# Patient Record
Sex: MALE | Race: WHITE | NOT HISPANIC OR LATINO | Employment: OTHER | URBAN - METROPOLITAN AREA
[De-identification: names, ages, dates, MRNs, and addresses within clinical notes are randomized per-mention and may not be internally consistent; named-entity substitution may affect disease eponyms.]

---

## 2020-06-24 ENCOUNTER — OFFICE VISIT (OUTPATIENT)
Dept: INTERNAL MEDICINE | Facility: CLINIC | Age: 30
End: 2020-06-24
Payer: MEDICAID

## 2020-06-24 VITALS
HEIGHT: 70 IN | TEMPERATURE: 97.9 F | BODY MASS INDEX: 25.05 KG/M2 | HEART RATE: 62 BPM | RESPIRATION RATE: 16 BRPM | DIASTOLIC BLOOD PRESSURE: 66 MMHG | OXYGEN SATURATION: 97 % | WEIGHT: 175 LBS | SYSTOLIC BLOOD PRESSURE: 104 MMHG

## 2020-06-24 DIAGNOSIS — L81.9 CHANGING PIGMENTED SKIN LESION: Primary | ICD-10-CM

## 2020-06-24 PROCEDURE — 99203 OFFICE O/P NEW LOW 30 MIN: CPT | Performed by: PHYSICIAN ASSISTANT

## 2020-06-24 RX ORDER — IBUPROFEN 200 MG
200 TABLET ORAL PRN
COMMUNITY

## 2020-06-24 RX ORDER — DIAPER,BRIEF,ADULT, DISPOSABLE
3 EACH MISCELLANEOUS
COMMUNITY

## 2020-06-24 SDOH — HEALTH STABILITY: MENTAL HEALTH: HOW OFTEN DO YOU HAVE A DRINK CONTAINING ALCOHOL?: 2-4 TIMES A MONTH

## 2020-06-24 SDOH — HEALTH STABILITY: MENTAL HEALTH: HOW MANY STANDARD DRINKS CONTAINING ALCOHOL DO YOU HAVE ON A TYPICAL DAY?: 1 OR 2

## 2020-06-24 SDOH — HEALTH STABILITY: MENTAL HEALTH: HOW OFTEN DO YOU HAVE 6 OR MORE DRINKS ON ONE OCCASION?: NEVER

## 2020-06-24 ASSESSMENT — MIFFLIN-ST. JEOR: SCORE: 1755.04

## 2020-06-24 NOTE — PROGRESS NOTES
"Subjective     Enrique Girard is a 30 year old male who presents to clinic today for the following health issues:    HPI   Lesion-(mole check)      Duration: years-couple have changed in last couple weeks    Description (location/character/radiation): raised, cut shaving-    Intensity:  mild, worrisome    Accompanying signs and symptoms: no    History (similar episodes/previous evaluation): yes, previously but they weren't changing-now are    Precipitating or alleviating factors: None    Therapies tried and outcome: None     -------------------------------------    BP Readings from Last 3 Encounters:   06/24/20 104/66    Wt Readings from Last 3 Encounters:   06/24/20 79.4 kg (175 lb)                    Reviewed and updated as needed this visit by Provider  Tobacco  Allergies  Meds         Review of Systems   Constitutional, HEENT, cardiovascular, pulmonary, gi and gu systems are negative, except as otherwise noted.      Objective    /66   Pulse 62   Temp 97.9  F (36.6  C) (Tympanic)   Resp 16   Ht 1.778 m (5' 10\")   Wt 79.4 kg (175 lb)   SpO2 97%   BMI 25.11 kg/m    Body mass index is 25.11 kg/m .  Physical Exam   GENERAL: healthy, alert and no distress  RESP: lungs clear to auscultation - no rales, rhonchi or wheezes  CV: regular rates and rhythm and normal S1 S2, no S3 or S4  SKIN: skin exam of lesions that patient was concerned about  Trunk, with skin tag type lesions noted anterior right and left axillary area  Appears a possible accessory nipple center chest.   Face :- lesion that patient is most worried about  Right side of face there is a raise lesion about 5-6 mm in size with flesh to darker brown pigmentation     Diagnostic Test Results:  none         Assessment & Plan     1. Changing pigmented skin lesion  Referral to derm, patient noted changing mole- raised and pigment change.  Also irritated with shaving as it is raised.   - DERMATOLOGY REFERRAL     BMI:   Estimated body mass index is " "25.11 kg/m  as calculated from the following:    Height as of this encounter: 1.778 m (5' 10\").    Weight as of this encounter: 79.4 kg (175 lb).           See Patient Instructions    No follow-ups on file.    Isatu Downing PA-C  [facility]    "

## 2022-03-28 ENCOUNTER — OFFICE VISIT (OUTPATIENT)
Dept: FAMILY MEDICINE | Facility: CLINIC | Age: 32
End: 2022-03-28

## 2022-03-28 VITALS
RESPIRATION RATE: 18 BRPM | WEIGHT: 185.6 LBS | DIASTOLIC BLOOD PRESSURE: 74 MMHG | OXYGEN SATURATION: 97 % | HEART RATE: 69 BPM | SYSTOLIC BLOOD PRESSURE: 133 MMHG | BODY MASS INDEX: 25.98 KG/M2 | HEIGHT: 71 IN

## 2022-03-28 DIAGNOSIS — M76.891 HIP FLEXOR TENDINITIS, RIGHT: ICD-10-CM

## 2022-03-28 DIAGNOSIS — M22.2X2 PATELLOFEMORAL PAIN SYNDROME OF LEFT KNEE: Primary | ICD-10-CM

## 2022-03-28 DIAGNOSIS — M77.8 TENDINITIS OF RIGHT WRIST: ICD-10-CM

## 2022-03-28 PROCEDURE — 99203 OFFICE O/P NEW LOW 30 MIN: CPT | Performed by: FAMILY MEDICINE

## 2022-03-28 NOTE — PATIENT INSTRUCTIONS
Understanding Patellofemoral Syndrome    Patellofemoral syndrome is a condition that causes pain on the front of the knee. The large bones of the upper and lower leg meet at the knee. This joint also includes a small triangle-shaped bone that rests on top of the leg bones. This is the kneecap (patella). Patellofemoral refers to the patella and the thighbone (femur). These bones are surrounded by connective tissue and muscles. Patellofemoral pain is believed to come from stress on the tissues of and around the knee. It's sometimes called runner's knee or jumper's knee because it's common in people who take part in sports.   What causes patellofemoral syndrome?  No single cause for patellofemoral pain has been found. But many things are likely to contribute to this type of knee pain. These include:     Actions that put repeated stress on the knee, such as running and squatting    Overtraining at a sport    Weak hip or thigh muscles    Normal variations in the way body parts fit together    Poor form during activities that stress the knee, such as running    A fall or blow to the knee  Symptoms of patellofemoral syndrome  Pain is a common symptom. It s often on the front of the knee, but can be around the kneecap. Pain can occur at certain times, such as when you are:     Running    Sitting for a long time with your knees bent, such as at a movie    Walking up or down stairs    Squatting  Other symptoms may include:    A feeling of the knee catching or locking    A grinding or crackling noise in your knee  Treatment for patellofemoral syndrome  Treatment focuses on reducing pain and avoiding further injury. Treatments may include:    Rest your leg. This gives your knee time to recover. You may need to avoid or change the activity that caused the problem, such as not running for a while.    Prescription or over-the-counter medicines. These help reduce inflammation, swelling, and pain. NSAIDs (nonsteroidal  anti-inflammatory drugs) are the most common medicines used. Medicines may be prescribed or bought over the counter. They may be given as pills. Or they may be put on the skin as a gel, cream, or patch.    Cold packs. These help reduce pain.    Stretches and other exercises. These can improve balance, flexibility, and strength.    A shoe insert (orthotic). This can make your knee more stable.    Elastic tape or a brace. These can make your knee more stable.    Physical therapy. This may include exercises or other treatments.    Surgery. In rare cases, if other treatments don t relieve symptoms, you may need surgery.  Possible complications of patellofemoral syndrome  If you don t give your knee time to heal, symptoms may return or get worse. Follow your healthcare provider s instructions on resting and treating your knee.   When to call your healthcare provider  Call your healthcare provider right away if you have any of these:    Fever of 100.4 F (38 C) or higher, or as directed by your provider    Chills    Pain that gets worse    Symptoms that don t get better, or get worse    New symptoms  [employer] last reviewed this educational content on 6/1/2019 2000-2021 [employer]. All rights reserved. This information is not intended as a substitute for professional medical care. Always follow your healthcare professional's instructions.

## 2022-03-28 NOTE — PROGRESS NOTES
"  Franklin Nunez is a 31 year old patient who presents to clinic for evaluation.  He is a new patient.    Right hip pain: approx 6 months.  No injury.  Pain is anterior.  Worse when standing up.  No numbness, tingling, swelling.  He lifts weights and is active.    Left knee pain: about 2 months.  Anterior pain.  Worse with standing and certain movements.  No swelling.  No locking, clicking or catching.  No redness.  No injury or trauma.    Right wrist pain: ongoing for months.  Dorsal.  Uses computer a lot.  Also ultimate frisbee.  No numbness, tingling, weakness        Review of Systems   Constitutional, HEENT, cardiovascular, pulmonary, gi and gu systems are negative, except as otherwise noted.      Objective    /74   Pulse 69   Resp 18   Ht 1.797 m (5' 10.75\")   Wt 84.2 kg (185 lb 9.6 oz)   SpO2 97%   BMI 26.07 kg/m      General: Well appearing, NAD  MSK:     Right hip normal appearance and ROM.  Tenderness over hip flexors.  Negative FADDIR and PAVITHRA.  Neg log roll.    Left knee: Normal appearance and ROM.  No focal tenderness.  Neg patellar grind.  No joint line pain.  Neg leonard.  Neg varus and valgus.  Right wrist: full ROM.  Tenderness over dorsal wrist joint.  Normal strength.    Psych: normal mood and affect        Patellofemoral pain syndrome of left knee  Recommend trial of PT and follow up in 4-6 weeks if not improved to consider imaging  - Physical Therapy Referral; Future    Hip flexor tendinitis, right  Same as above  - Physical Therapy Referral; Future    Tendinitis of right wrist  Same as above  - Physical Therapy Referral; Future              "

## 2022-03-30 ENCOUNTER — THERAPY VISIT (OUTPATIENT)
Dept: PHYSICAL THERAPY | Facility: CLINIC | Age: 32
End: 2022-03-30
Attending: FAMILY MEDICINE
Payer: COMMERCIAL

## 2022-03-30 DIAGNOSIS — M76.891 HIP FLEXOR TENDINITIS, RIGHT: ICD-10-CM

## 2022-03-30 DIAGNOSIS — M77.8 TENDINITIS OF RIGHT WRIST: ICD-10-CM

## 2022-03-30 DIAGNOSIS — M25.551 HIP PAIN, RIGHT: ICD-10-CM

## 2022-03-30 DIAGNOSIS — M25.562 LEFT KNEE PAIN: ICD-10-CM

## 2022-03-30 DIAGNOSIS — M22.2X2 PATELLOFEMORAL PAIN SYNDROME OF LEFT KNEE: ICD-10-CM

## 2022-03-30 PROCEDURE — 97110 THERAPEUTIC EXERCISES: CPT | Mod: GP | Performed by: PHYSICAL THERAPIST

## 2022-03-30 PROCEDURE — 97161 PT EVAL LOW COMPLEX 20 MIN: CPT | Mod: GP | Performed by: PHYSICAL THERAPIST

## 2022-03-30 ASSESSMENT — ACTIVITIES OF DAILY LIVING (ADL)
LIGHT_TO_MODERATE_WORK: NO DIFFICULTY AT ALL
WALKING_15_MINUTES_OR_GREATER: NO DIFFICULTY AT ALL
ROLLING_OVER_IN_BED: NO DIFFICULTY AT ALL
HOW_WOULD_YOU_RATE_YOUR_CURRENT_LEVEL_OF_FUNCTION_DURING_YOUR_USUAL_ACTIVITIES_OF_DAILY_LIVING_FROM_0_TO_100_WITH_100_BEING_YOUR_LEVEL_OF_FUNCTION_PRIOR_TO_YOUR_HIP_PROBLEM_AND_0_BEING_THE_INABILITY_TO_PERFORM_ANY_OF_YOUR_USUAL_DAILY_ACTIVITIES?: 98
WALKING_DOWN_STEEP_HILLS: NO DIFFICULTY AT ALL
GETTING_INTO_AND_OUT_OF_A_BATHTUB: NO DIFFICULTY AT ALL
TWISTING/PIVOTING_ON_INVOLVED_LEG: SLIGHT DIFFICULTY
GOING_UP_1_FLIGHT_OF_STAIRS: NO DIFFICULTY AT ALL
GOING_DOWN_1_FLIGHT_OF_STAIRS: NO DIFFICULTY AT ALL
SITTING_FOR_15_MINUTES: SLIGHT DIFFICULTY
HOS_ADL_SCORE(%): 95.59
WALKING_INITIALLY: NO DIFFICULTY AT ALL
DEEP_SQUATTING: SLIGHT DIFFICULTY
HEAVY_WORK: NO DIFFICULTY AT ALL
HOS_ADL_COUNT: 17
WALKING_APPROXIMATELY_10_MINUTES: NO DIFFICULTY AT ALL
HOS_ADL_ITEM_SCORE_TOTAL: 65
STANDING_FOR_15_MINUTES: NO DIFFICULTY AT ALL
PUTTING_ON_SOCKS_AND_SHOES: NO DIFFICULTY AT ALL
STEPPING_UP_AND_DOWN_CURBS: NO DIFFICULTY AT ALL
GETTING_INTO_AND_OUT_OF_AN_AVERAGE_CAR: SLIGHT DIFFICULTY
RECREATIONAL_ACTIVITIES: NO DIFFICULTY AT ALL
HOS_ADL_HIGHEST_POTENTIAL_SCORE: 68
WALKING_UP_STEEP_HILLS: NO DIFFICULTY AT ALL

## 2022-03-30 NOTE — PROGRESS NOTES
Physical Therapy Initial Evaluation  Subjective:  The history is provided by the patient. No  was used.   Therapist Generated HPI Evaluation  Problem details: Saw MD for left knee pain and right hip/groin pain on 3/28/2022.  Some knee pain with working out with squats and dead lifts and with descending steps.  Plays ultimate and coaches.  Uses the gym at [facility].  Resting and stretching the quad has helped knee but nothing for the hip.   No imaging for the hip or knee.  Pain in the right hip with hip flexed and moving out of a seated position..  Sharp pain in the groin.  No pain with running.  No radiating pain. .         Type of problem:  Right knee.      Condition occurred with:  Insidious onset.  Where condition occurred: at home and in the community.  Patient reports pain:  Anterior.  Pain is described as aching and is intermittent.  Pain is worse during the day.  Since onset symptoms are unchanged.  Symptoms are exacerbated by kneeling, bending/squatting and certain positions  and relieved by rest.      Restrictions due to condition include:  Working in normal job without restrictions.      Therapist Generated HPI Evaluation         Type of problem:  Right hip.    This is a new condition.  Condition occurred with:  Insidious onset.  Where condition occurred: at home.  Patient reports pain:  Anterior and medial.  Pain is described as sharp and aching and is intermittent.  Pain radiates to:  No radiation. Pain is worse during the day.  Since onset symptoms are unchanged.  Associated symptoms:  Loss of motion/stiffness. Symptoms are exacerbated by sitting (moving to standing)  and relieved by nothing.          Patient Health History  Enrique Girard being seen for left knee pain , right hip pain.     Date of Onset: 2 months and 6 months.   Problem occurred: overuse?   Pain is reported as 3/10 on pain scale.  General health as reported by patient is good.     Red flags:  None as reported by  patient.   Other medical allergies details: amoxicillin.           Current occupation is self employed.   Primary job tasks include:  Prolonged sitting, driving and computer work.                                    Objective:  System                                           Hip Evaluation  Hip PROM:    Flexion: Left: no pain   Right: no pain        Internal Rotation: Left: 30    Right: 20  External Rotation: Left: 45    Right: 40              Hip Strength:        Abduction:  Left: 5-/5     Pain:Right: 5-/5    Pain:  Adduction:  Left: 5-/5    Pain:Right:  5-/5   +  Pain:strong/painful                Hip Special Testing:      Left hip negative for the following special tests:  Fadir/Labrum; SLR or Neelima's   Right hip positive for the following special tests:  FabreRight hip negative for the following special tests:  Fadir/Labrum; SLR or Neelima's    Hip Palpation:      Right hip tenderness present at:  Adductors       Knee Evaluation:  ROM:      PROM    Hyperextension: Left: 10    Right:  10            Ligament Testing:  Not Assessed                Special Tests: Special test for knee: pain with squat to 90 and 6 inch step down.    Right knee positive for the following tests:  Asterisk Sign  Palpation:  Palpation of knee: infrapatellar patellar.      Edema:  Normal    Mobility Testing:  Normal            Functional Testing:  : poor hip control on the left with step down                   General     ROS    Assessment/Plan:    Patient is a 31 year old male with right side hip and left side knee complaints.    Patient has the following significant findings with corresponding treatment plan.                Diagnosis 1:  Right groin pain  Pain -  manual therapy, self management and education  Decreased ROM/flexibility - manual therapy, therapeutic exercise and home program  Decreased joint mobility - manual therapy, therapeutic exercise and home program  Decreased strength - therapeutic exercise, therapeutic activities and  home program  Decreased function - therapeutic activities and home program  Diagnosis 2:  Left knee pain   Pain -  manual therapy, self management, education and home program  Decreased function - therapeutic activities and home program    Therapy Evaluation Codes:   1) History comprised of:   Personal factors that impact the plan of care:      Time since onset of symptoms.    Comorbidity factors that impact the plan of care are:      None.     Medications impacting care: None.  2) Examination of Body Systems comprised of:   Body structures and functions that impact the plan of care:      Hip and Knee.   Activity limitations that impact the plan of care are:      Sitting, Sports, Squatting/kneeling and Stairs.  3) Clinical presentation characteristics are:   Stable/Uncomplicated.  4) Decision-Making    Low complexity using standardized patient assessment instrument and/or measureable assessment of functional outcome.  Cumulative Therapy Evaluation is: Low complexity.    Previous and current functional limitations:  (See Goal Flow Sheet for this information)    Short term and Long term goals: (See Goal Flow Sheet for this information)     Communication ability:  Patient appears to be able to clearly communicate and understand verbal and written communication and follow directions correctly.  Treatment Explanation - The following has been discussed with the patient:   RX ordered/plan of care  Anticipated outcomes  Possible risks and side effects  This patient would benefit from PT intervention to resume normal activities.   Rehab potential is excellent.    Frequency:  1 X week, once daily  Duration:  for 3 weeks then 1 time a month for on month  Discharge Plan:  Achieve all LTG.  Independent in home treatment program.  Reach maximal therapeutic benefit.    Please refer to the daily flowsheet for treatment today, total treatment time and time spent performing 1:1 timed codes.

## 2022-03-30 NOTE — PROGRESS NOTES
[facility]    OUTPATIENT Physical Therapy ORTHOPEDIC EVALUATION  PLAN OF TREATMENT FOR OUTPATIENT REHABILITATION  (COMPLETE FOR INITIAL CLAIMS ONLY)  Patient's Last Name, First Name, M.I.  YOB: 1990  Enrique Girard    Provider s Name:  [facility]   Medical Record No.  [id number]   Start of Care Date:  03/30/22   Onset Date:    (3/28/2022 date patient saw MD)   Type:     _X__PT   ___OT Medical Diagnosis:    Encounter Diagnoses   Name Primary?     Patellofemoral pain syndrome of left knee      Hip flexor tendinitis, right      Tendinitis of right wrist      Hip pain, right      Left knee pain         Treatment Diagnosis:  right hip        Goals:     03/30/22 0500   Body Part   Goals listed below are for right hip and left knee   Goal #1   Goal #1 stairs   Previous Functional Level No restrictions   Current Functional Level Descend stairs;in a normal reciprocal pattern   Performance Level with left knee pain 3/10   STG Target Performance Descend stairs;in a normal reciprocal pattern   Performance Level with pain 1/10   Rationale for safe community ambulaton   Due Date 04/30/22   LTG Target Performance Descend stairs;in a normal reciprocal pattern   Performance Level without pain and no pain with squats   Rationale for safe community ambulaton   Due Date 05/30/22   Goal #2   Goal #2 sitting   Previous Functional Level No restrictions   Current Functional Level Hours patient can sit   Performance level one hour with right groin pain sitting and moving out of sitting   LTG Target Performance Hours patient will be able to sit   Performance Level 2 with no c/o hip pain   Rationale to allow rest from standing;for job requirements in their work place   Due date 05/30/22       Therapy Frequency:  once a week  Predicted Duration of Therapy Intervention:  2 months    Alison  Debby, PT                 I CERTIFY THE NEED FOR THESE SERVICES FURNISHED UNDER        THIS PLAN OF TREATMENT AND WHILE UNDER MY CARE     (Physician attestation of this document indicates review and certification of the therapy plan).                       Certification Date From:  03/30/22   Certification Date To:  06/27/22    Referring Provider:  Joel Baca    Initial Assessment        See Epic Evaluation SOC Date: 03/30/22

## 2022-04-06 ENCOUNTER — THERAPY VISIT (OUTPATIENT)
Dept: PHYSICAL THERAPY | Facility: CLINIC | Age: 32
End: 2022-04-06
Payer: COMMERCIAL

## 2022-04-06 DIAGNOSIS — M25.551 HIP PAIN, RIGHT: ICD-10-CM

## 2022-04-06 DIAGNOSIS — M25.562 LEFT KNEE PAIN: ICD-10-CM

## 2022-04-06 PROCEDURE — 97112 NEUROMUSCULAR REEDUCATION: CPT | Mod: GP | Performed by: PHYSICAL THERAPIST

## 2022-04-06 PROCEDURE — 97110 THERAPEUTIC EXERCISES: CPT | Mod: GP | Performed by: PHYSICAL THERAPIST

## 2022-04-13 ENCOUNTER — THERAPY VISIT (OUTPATIENT)
Dept: PHYSICAL THERAPY | Facility: CLINIC | Age: 32
End: 2022-04-13
Payer: COMMERCIAL

## 2022-04-13 DIAGNOSIS — M25.562 LEFT KNEE PAIN: ICD-10-CM

## 2022-04-13 DIAGNOSIS — M25.551 HIP PAIN, RIGHT: Primary | ICD-10-CM

## 2022-04-13 PROCEDURE — 97110 THERAPEUTIC EXERCISES: CPT | Mod: GP | Performed by: PHYSICAL THERAPIST

## 2022-05-18 PROBLEM — M25.562 LEFT KNEE PAIN: Status: RESOLVED | Noted: 2022-03-30 | Resolved: 2022-05-18

## 2022-05-18 PROBLEM — M25.551 HIP PAIN, RIGHT: Status: RESOLVED | Noted: 2022-03-30 | Resolved: 2022-05-18

## 2022-05-18 NOTE — PROGRESS NOTES
DISCHARGE SUMMARY    Enrique Girard was seen 3 times for evaluation and treatment.  Patient did not return for further treatment and current status is unknown.  Due to short treatment duration, no objective or functional changes were made.  Please see goal flow sheet from episode noted date below and initial evaluation for further information.  Patient is discharged from therapy and therapy episode is resolved as of 05/18/22.      Linked Episodes   Type: Episode: Status: Noted: Resolved: Last update: Updated by:   PHYSICAL THERAPY left knee and right [id number] Active 3/30/2022  4/13/2022  8:52 AM Alison Guardado PT      Comments:       Please Contact me with any questions or concerns. Thank you for for patience and cooperation.     Don Mares PT, DPT, [facility], [place]  Physical Therapist  [facility] [place]  [phone]

## 2022-07-11 ENCOUNTER — OFFICE VISIT (OUTPATIENT)
Dept: FAMILY MEDICINE | Facility: CLINIC | Age: 32
End: 2022-07-11

## 2022-07-11 VITALS
RESPIRATION RATE: 18 BRPM | WEIGHT: 179.6 LBS | DIASTOLIC BLOOD PRESSURE: 78 MMHG | HEIGHT: 71 IN | HEART RATE: 63 BPM | BODY MASS INDEX: 25.15 KG/M2 | SYSTOLIC BLOOD PRESSURE: 116 MMHG | OXYGEN SATURATION: 97 %

## 2022-07-11 DIAGNOSIS — M77.8 TENDINITIS OF RIGHT WRIST: Primary | ICD-10-CM

## 2022-07-11 PROCEDURE — 99213 OFFICE O/P EST LOW 20 MIN: CPT | Performed by: FAMILY MEDICINE

## 2022-07-11 NOTE — PROGRESS NOTES
"  Franklin Nunez is a 32 year old patient who presents to clinic for evaluation of right wrist pain.  Ongoing intermittently for years but more bothersome lately.  Hurts doing push ups and ultimate frisbee.  Pain with wrist extension.  No numbness or tingling.  No trauma.  Has used brace and ibuprofen in past with some improvement.        Review of Systems   Constitutional, HEENT, cardiovascular, pulmonary, gi and gu systems are negative, except as otherwise noted.      Objective    /78   Pulse 63   Resp 18   Ht 1.797 m (5' 10.75\")   Wt 81.5 kg (179 lb 9.6 oz)   SpO2 97%   BMI 25.23 kg/m      General: Well appearing, NAD  MSK: slight reduced ROM with wrist extension on right.  Mild tenderness over dorsal wrist with palpation.  No swelling or erythema.  Pain with resisted wrist extension  Psych: normal mood and affect      Tendinitis of right wrist  C/w tendinopathy.  Trial of PT, ice, ibuprofen.  Follow up if not improving  - Occupational Therapy Referral; Future          "

## 2022-08-16 ENCOUNTER — THERAPY VISIT (OUTPATIENT)
Dept: OCCUPATIONAL THERAPY | Facility: CLINIC | Age: 32
End: 2022-08-16
Attending: FAMILY MEDICINE
Payer: COMMERCIAL

## 2022-08-16 DIAGNOSIS — M77.8 TENDINITIS OF RIGHT WRIST: Primary | ICD-10-CM

## 2022-08-16 DIAGNOSIS — M25.531 RIGHT WRIST PAIN: ICD-10-CM

## 2022-08-16 PROCEDURE — 97110 THERAPEUTIC EXERCISES: CPT | Mod: GO | Performed by: OCCUPATIONAL THERAPIST

## 2022-08-16 PROCEDURE — 97165 OT EVAL LOW COMPLEX 30 MIN: CPT | Mod: GO | Performed by: OCCUPATIONAL THERAPIST

## 2022-08-16 PROCEDURE — 97760 ORTHOTIC MGMT&TRAING 1ST ENC: CPT | Mod: GO | Performed by: OCCUPATIONAL THERAPIST

## 2022-08-16 NOTE — PROGRESS NOTES
Hand Therapy Initial Evaluation    Current Date:  8/16/2022    Diagnosis: R wrist tendinitis  DOI: ~4-5 years ago (7/11/2022 MD order date)    Subjective:  Enrique Girard is a 32 year old male.    Patient reports symptoms of the right wrist which occurred due to gradual onset. Since onset symptoms are Unchanged, though fluctuates    No past medical history on file.  No past surgical history on file.    Current occupation is Runs a [employer]  Job Tasks: Computer Work, Prolonged Sitting, Repetitive Tasks    Occupational Profile Information:  Right hand dominant  Prior functional level:  independent-shared household chores  Patient reports symptoms of pain and weakness/loss of strength  Special tests:  x-ray.    Previous treatment: None  Barriers include:none  Mobility: No difficulty  Transportation: drives and bicycles  Currently working in normal job without restrictions  Leisure activities/hobbies: Biking, ultimate frisbee, coaching  Other: [facility]    Functional Outcome Measure:   Upper Extremity Functional Index Score:  SCORE:   Column Totals: /80: 75   (A lower score indicates greater disability.)    Objective:  Pain Level (Scale 0-10)   8/16/2022   At Rest 0/10   With Use 6-7/10     Pain Description  Date 8/16/2022   Location wrist   Pain Quality Sharp   Frequency intermittent     Pain is worst  daytime   Exacerbated by  use, weight bearing, yoga, frisbee   Relieved by NSAIDs, rest, OTC brace and Ice   Progression Unchanged, tends to fluctuate     Edema  Mild  Edema (Circumference measured in cm)   8/16/2022 8/16/2022    L R   DWC 16.6 16.9     Sensation   WNL throughout all nerve distributions; per patient report    ROM  Pain Report: - none  + mild    ++ moderate    +++ severe   Wrist 8/16/2022 8/16/2022   AROM (PROM) L R   Extension 73 72   Flexion 81 73   RD 14 16   UD 34 45   Supination WNL WNL   Pronation WNL WNL     Tenderness: Pain level report on scale 0-10/10  WRIST PALPATION:  Date 8/16/2022    Side R   Ulna Styloid -   TFCC -   Distal Radius -   Radial Styloid -   DRUJ -   Volar Scaphoid -   Dorsal Scaphoid -   Volar Lunate -   Dorsal Lunate -     Strength   (Measured in pounds)  Pain Report: - none  + mild    ++ moderate    +++ severe    8/16/2022 8/16/2022   Trials L R   1  2  3 86 96   Average 86 96     Lat Pinch 8/16/2022 8/16/2022   Trials L R   1  2  3 20 21   Average 20 21     3 Pt Pinch 8/16/2022 8/16/2022   Trials L R   1  2  3 12 13   Average 12 13     Resisted Testing  Pain Report:  - none    + mild    ++ moderate    +++ severe    8/16/2022   Elbow Extension 5/5 -   Elbow Flexion 5/5 -   Wrist Ext with RD, Elbow at side 5/5 -   Wrist Ext with UD, Elbow at side 5/5 -   Wrist Flex with RD, Elbow at side 5/5 -   Wrist Flex with UD, Elbow at side 5/5 -     Assessment:  Patient presents with symptoms consistent with diagnosis of right wrist tendinitis,  with conservative intervention.     Patient's limitations or Problem List includes:  Pain, Increased edema and Weakness of the right wrist which interferes with the patient's ability to perform Self Care Tasks (bathing), Work Tasks, Recreational Activities and Household Chores as compared to previous level of function.    Rehab Potential:  Excellent - Return to full activity, no limitations    Patient will benefit from skilled Occupational Therapy to increase ROM and overall strength and decrease pain and edema to return to previous activity level and resume normal daily tasks and to reach their rehab potential.    Barriers to Learning:  No barrier    Communication Issues:  Patient appears to be able to clearly communicate and understand verbal and written communication and follow directions correctly.    Chart Review: Chart Review, Brief history including review of medical and/or therapy records relating to the presenting problem and Simple history review with patient    Identified Performance Deficits: functional mobility, health management  and maintenance, home establishment and management, work and leisure activities    Assessment of Occupational Performance:  3-5 Performance Deficits    Clinical Decision Making (Complexity): Low complexity    Treatment Explanation:  The following has been discussed with the patient:  RX ordered/plan of care  Anticipated outcomes  Possible risks and side effects    Plan:  Frequency:  1 X month, once daily  Duration:  for 2 months    Treatment Plan:   Modalities:  US, Fluidotherapy and Paraffin  Therapeutic Exercise:  AROM, AAROM, PROM, Tendon Gliding, Blocking, Reverse Blocking, Place and Hold, Contract Relax, Extensor Tracking, Isotonics, Isometrics and Stabilization   Neuromuscular re-education:  Nerve Gliding, Coordination/Dexterity, Sensory re-education, Desensitization, Kinesthetic Training, Proprioceptive Training, Posture, Kinesiotaping, Strain Counter Strain, Isometrics, Stabilization   Manual Techniques:  Coordination/Dexterity, Joint mobilization, Scar mobilization, Friction massage, Myofascial release, Manual edema mobilization   Orthotic Fabrication:  Forearm based  Self Care:  Self Care Tasks, Ergonomic Considerations and Work Tasks    Discharge Plan:  Achieve all LTG.  Independent in home treatment program.  Reach maximal therapeutic benefit.    Home Exercise Program:  Orthosis Wear and Care  EMR Notes  HEP - Sets  Reps  Sessions per day  Notes  Ball Massage to Extensors  EMR Notes  HEP - Sets  Reps 3-5min per comfort  Sessions per day 2-3  Notes  Wrist Passive Range of Motion Radial Deviation  EMR Notes  HEP - Sets 1  Reps 5  Sessions per day 3  Notes Hold for approximately 10 seconds per comfort, pain free  Icing  EMR Notes  HEP - Sets  Reps  Sessions per day 2-3  Notes -Fill up the elsie cup with water and let it freeze -Peel off half the cup so it looks like lipstick -Ice directly onto your skin for 1-2 minutes or until numb    Next Visit:  Pt reports minimal functional limitations. If pt returns to  therapy, progress HEP and adjust orthosis. Consider k-taping or modalities. If pt does not return to therapy, consider this the discharge note.

## 2022-08-18 PROBLEM — M77.8 TENDINITIS OF RIGHT WRIST: Status: ACTIVE | Noted: 2022-08-18

## 2022-08-18 PROBLEM — M25.531 RIGHT WRIST PAIN: Status: ACTIVE | Noted: 2022-08-18

## 2022-08-18 NOTE — PROGRESS NOTES
[facility]    OUTPATIENT Occupational Therapy ORTHOPEDIC EVALUATION  PLAN OF TREATMENT FOR OUTPATIENT REHABILITATION  (COMPLETE FOR INITIAL CLAIMS ONLY)  Patient's Last Name, First Name, M.I.  YOB: 1990  Enrique Girard    Provider s Name:  [facility]   Medical Record No.  [id number]   Start of Care Date:  08/16/22   Onset Date:   07/11/22 (MD order date)   Type:     ___PT   _X__OT Medical Diagnosis:    Encounter Diagnosis   Name Primary?    Tendinitis of right wrist         Treatment Diagnosis:  R wrist tendinitis        Goals:     08/18/22 0500   Goal #1   Goal #1 bathing   Previous Performance Level Independent   Current Functional Task Bear Weight   Current Performance Level 7/10 pain weight bearing   STG Target Performance To get out of bathtub/shower   STG Target Perform Level 5/10 pain weight bearing   Due date 09/18/22   LTG Target Task/Performance Pain free bathing   Due Date 10/18/22       Therapy Frequency:  1x/month  Predicted Duration of Therapy Intervention:  2 months    Kika Wilburn OT                 I CERTIFY THE NEED FOR THESE SERVICES FURNISHED UNDER        THIS PLAN OF TREATMENT AND WHILE UNDER MY CARE     (Physician attestation of this document indicates review and certification of the therapy plan).                     Certification Date From:  08/16/22   Certification Date To:  10/16/22    Referring Provider:  Joel Baca    Initial Assessment        See Epic Evaluation SOC Date: 08/16/22

## 2022-09-24 ENCOUNTER — HEALTH MAINTENANCE LETTER (OUTPATIENT)
Age: 32
End: 2022-09-24

## 2022-11-22 ENCOUNTER — OFFICE VISIT (OUTPATIENT)
Dept: FAMILY MEDICINE | Facility: CLINIC | Age: 32
End: 2022-11-22

## 2022-11-22 VITALS
SYSTOLIC BLOOD PRESSURE: 118 MMHG | DIASTOLIC BLOOD PRESSURE: 70 MMHG | HEART RATE: 54 BPM | OXYGEN SATURATION: 97 % | BODY MASS INDEX: 26.21 KG/M2 | WEIGHT: 186.6 LBS

## 2022-11-22 DIAGNOSIS — H93.13 TINNITUS, BILATERAL: Primary | ICD-10-CM

## 2022-11-22 PROCEDURE — 90674 CCIIV4 VAC NO PRSV 0.5 ML IM: CPT | Performed by: FAMILY MEDICINE

## 2022-11-22 PROCEDURE — 90471 IMMUNIZATION ADMIN: CPT | Mod: 59 | Performed by: FAMILY MEDICINE

## 2022-11-22 PROCEDURE — 99213 OFFICE O/P EST LOW 20 MIN: CPT | Mod: 25 | Performed by: FAMILY MEDICINE

## 2022-11-22 NOTE — PROGRESS NOTES
Franklin Nunez is a 32 year old patient who presents to clinic for evaluation.  Reports bilateral tinnitus for about one year.  Denies dizziness, hearing loss or other neurologic symptoms.  No ear pain.  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  Rare use of NSAIDs.  No other meds.          Review of Systems   Constitutional, HEENT, cardiovascular, pulmonary, GI, , musculoskeletal, neuro, skin, endocrine and psych systems are negative, except as otherwise noted.      Objective    /70   Pulse 54   Wt 84.6 kg (186 lb 9.6 oz)   SpO2 97%   BMI 26.21 kg/m      General: Well appearing, NAD  HEENT: Right canal and TM normal.  Left canal normal.  Small area of focal erythema left TM.  No effusion  Psych: normal mood and affect        Tinnitus, bilateral  Uncertain etiology.  Discussed likely no cause will be identified.  Discussed rare but serious causes including acoustic neuroma.  Would like to see ENT to discuss further eval.  Limit further exposure to loud sounds.  - Adult ENT  Referral; Future          Answers for HPI/ROS submitted by the patient on 11/22/2022  How many servings of fruits and vegetables do you eat daily?: 2-3  On average, how many sweetened beverages do you drink each day (Examples: soda, juice, sweet tea, etc.  Do NOT count diet or artificially sweetened beverages)?: 0  How many minutes a day do you exercise enough to make your heart beat faster?: 10 to 19  How many days a week do you exercise enough to make your heart beat faster?: 4  How many days per week do you miss taking your medication?: 0  What is the reason for your visit today?: I think I have tinnitus  When did your symptoms begin?: More than a month  What are your symptoms?: Ringing in my ears  How would you describe these symptoms?: Mild  Are your symptoms:: Worsening  Have you had these symptoms before?: No  Is there anything that makes you feel worse?: Stress, loud sounds,  fatigue

## 2022-12-12 ENCOUNTER — TRANSFERRED RECORDS (OUTPATIENT)
Dept: FAMILY MEDICINE | Facility: CLINIC | Age: 32
End: 2022-12-12

## 2023-10-08 ENCOUNTER — HEALTH MAINTENANCE LETTER (OUTPATIENT)
Age: 33
End: 2023-10-08

## 2023-10-10 ENCOUNTER — OFFICE VISIT (OUTPATIENT)
Dept: FAMILY MEDICINE | Facility: CLINIC | Age: 33
End: 2023-10-10

## 2023-10-10 VITALS
HEIGHT: 71 IN | HEART RATE: 64 BPM | OXYGEN SATURATION: 97 % | BODY MASS INDEX: 25.7 KG/M2 | SYSTOLIC BLOOD PRESSURE: 112 MMHG | DIASTOLIC BLOOD PRESSURE: 73 MMHG | WEIGHT: 183.6 LBS

## 2023-10-10 DIAGNOSIS — M54.50 CHRONIC BILATERAL LOW BACK PAIN WITHOUT SCIATICA: Primary | ICD-10-CM

## 2023-10-10 DIAGNOSIS — Z23 ENCOUNTER FOR IMMUNIZATION: ICD-10-CM

## 2023-10-10 DIAGNOSIS — M79.605 PAIN OF LEFT LOWER EXTREMITY: ICD-10-CM

## 2023-10-10 DIAGNOSIS — G89.29 CHRONIC BILATERAL LOW BACK PAIN WITHOUT SCIATICA: Primary | ICD-10-CM

## 2023-10-10 PROCEDURE — 90471 IMMUNIZATION ADMIN: CPT | Mod: 59 | Performed by: FAMILY MEDICINE

## 2023-10-10 PROCEDURE — 99213 OFFICE O/P EST LOW 20 MIN: CPT | Mod: 25 | Performed by: FAMILY MEDICINE

## 2023-10-10 PROCEDURE — 90674 CCIIV4 VAC NO PRSV 0.5 ML IM: CPT | Performed by: FAMILY MEDICINE

## 2023-10-10 NOTE — PROGRESS NOTES
"  Franklin Nunez is a 33 year old patient who presents to clinic for evaluation.  Reports irritating his low back about 7 months ago doing exercises.  It has been a slow recovery with ongoing low back tenderness.  He also reports aching in left gluteal area, left hamstring and left calf at times.  Not reproducible.  Denies radiating pain.  Denies fever, urinary incontinence of retention, stool changes or saddle anesthesia.          Review of Systems   Constitutional, HEENT, cardiovascular, pulmonary, GI, , musculoskeletal, neuro, skin, endocrine and psych systems are negative, except as otherwise noted.      Objective    /73   Pulse 64   Ht 1.81 m (5' 11.25\")   Wt 83.3 kg (183 lb 9.6 oz)   SpO2 97%   BMI 25.43 kg/m      General: Well appearing, NAD  Musculoskeletal :    Normal ROM with normal flexion, extension, lateral flexion, and rotation without limitation.    No tenderness midline or paraspinous muscles, no tenderness with stress of the SI joint bilaterally.   Power : hip flexion iliopsoas (L3), knee extension (L4), foot dorsiflexion anterior tibialis / great toe flexion (L5), foot plantarflexion (S1), knee flexion (S2) : 5/5 throughout.  Heel (L5), toe (S1) gait intact.   Sensation: intact to knee and medial leg (L4), foot dorsum and great toe (L5), and lateral foot and plantar foot (S1).   Reflexes : 2+ patellar and achilles reflexes.          No results found for this or any previous visit (from the past 24 hour(s)).    Chronic bilateral low back pain without sciatica  Suspect this may all by discogenic pain.  No red flag symptoms.  Trial of PT with plan for MRI if not improving.  Await PT recs.  Cont symptomatic and supportive cares.  - Physical Therapy Referral; Future    Pain of left lower extremity  - Physical Therapy Referral; Future    Encounter for immunization  - VACCINE ADMINISTRATION, INITIAL          "

## 2023-10-13 ENCOUNTER — THERAPY VISIT (OUTPATIENT)
Dept: PHYSICAL THERAPY | Facility: CLINIC | Age: 33
End: 2023-10-13
Attending: FAMILY MEDICINE
Payer: COMMERCIAL

## 2023-10-13 DIAGNOSIS — G89.29 CHRONIC BILATERAL LOW BACK PAIN WITHOUT SCIATICA: ICD-10-CM

## 2023-10-13 DIAGNOSIS — M54.50 CHRONIC BILATERAL LOW BACK PAIN WITHOUT SCIATICA: ICD-10-CM

## 2023-10-13 DIAGNOSIS — M79.605 PAIN OF LEFT LOWER EXTREMITY: ICD-10-CM

## 2023-10-13 PROCEDURE — 97161 PT EVAL LOW COMPLEX 20 MIN: CPT | Mod: GP

## 2023-10-13 PROCEDURE — 97110 THERAPEUTIC EXERCISES: CPT | Mod: GP

## 2023-10-13 NOTE — PROGRESS NOTES
PHYSICAL THERAPY EVALUATION  Type of Visit: Evaluation    See electronic medical record for Abuse and Falls Screening details.    Subjective       Presenting condition or subjective complaint: lower back and leg pain  Date of onset: 10/10/23    Relevant medical history:     Dates & types of surgery:      Prior diagnostic imaging/testing results:       Prior therapy history for the same diagnosis, illness or injury: No      Started noticing back pain over winter and spring from lifting. Pain got worse with loading his spine, bending hurts, worse after exercise, sitting hurts the most. His hamstring began hurting after a ultimate [employer] tournament. He noticed his back also was aching, which then caused pain in his shin and ankle.     Living Environment  Social support: With a significant other or spouse   Type of home: Apartment/condo   Stairs to enter the home: Yes   Is there a railing: Yes   Ramp: No   Stairs inside the home: Yes       Help at home: None  Equipment owned:       Employment: Yes    Hobbies/Interests: biking, coaching, ultimate frisbee, climbing    Patient goals for therapy: exercise, lift weights    Pain assessment: See objective evaluation for additional pain details     Objective   LUMBAR SPINE EVALUATION  PAIN: Pain Level at Rest: 1/10  Pain Level with Use: 3/10  Pain Location: lumbar spine  Pain Quality: Aching  Pain is Exacerbated By: sitting increases pain  Pain is Relieved By: nothing  INTEGUMENTARY (edema, incisions): WNL  POSTURE: WNL  BALANCE/PROPRIOCEPTION: WNL  WEIGHTBEARING ALIGNMENT: Lumbar/Pelvic WB Alignment:Lordosis decreased  ROM: PROM WNL  MYOTOMES:    Left Right   T12-L3 (Hip Flexion) 5 5   L2-4 (Quads)  5 5   L4 (Ankle DF) 5 5   L5 (Great Toe Ext) 5 5   S1 (Toe Raise) 5 5     DERMATOMES:  some pain along S1 distribution  NEURAL TENSION:  slump L +  FLEXIBILITY: WNL  LUMBAR/HIP Special Tests:    Left Right   PAVITHRA Negative  Negative    FADIR/Labrum/SUNNY Negative  Negative    SLR  Negative  Negative    Slump Positive Negative       FUNCTIONAL TESTS: Double Leg Squat: Good technique/no significant findings  Single Leg Squat: Anterior knee translation, Increased trunk lean, and Improper use of glutes/hips  PALPATION: WNL  SPINAL SEGMENTAL CONCLUSIONS:  normal segmental mobility    Assessment & Plan   CLINICAL IMPRESSIONS  Medical Diagnosis: Chronic bilateral low back pain without sciatica, Pain of left lower extremity    Treatment Diagnosis: low back pain, left hamstring weakness   Impression/Assessment: Patient is a 33 year old male with low back pain and L hamstring complaints.  The following significant findings have been identified: Pain, Decreased ROM/flexibility, Impaired balance, Impaired sensation, Impaired muscle performance, and Decreased activity tolerance. These impairments interfere with their ability to perform self care tasks, work tasks, recreational activities, household chores, and driving  as compared to previous level of function.     Clinical Decision Making (Complexity):  Clinical Presentation: Stable/Uncomplicated  Clinical Presentation Rationale: based on medical and personal factors listed in PT evaluation  Clinical Decision Making (Complexity): Low complexity    PLAN OF CARE  Treatment Interventions:  Modalities: Cryotherapy, Dry Needling, E-stim, Hot Pack, Mechanical Traction  Interventions: Gait Training, Manual Therapy, Neuromuscular Re-education, Therapeutic Activity, Therapeutic Exercise, Self-Care/Home Management    Long Term Goals     PT Goal 1  Goal Identifier: LTG  Goal Description: Patient will be able to sit for longer than 1 hour with 2/10 pain or less in lower back  Rationale: to maximize safety and independence within the home;to maximize safety and independence within the community;to maximize safety and independence with performance of ADLs and functional tasks;to maximize safety and independence with self cares  Target Date: 01/26/24  PT Goal 2  Goal  Identifier: STG  Goal Description: patient will be able to go 1 week without symptom referral to [facility]  Rationale: to maximize safety and independence with performance of ADLs and functional tasks;to maximize safety and independence within the home;to maximize safety and independence with self cares  Target Date: 12/08/23      Frequency of Treatment: 1x week for 3 visits progressing to 1x every 3 weeks  Duration of Treatment: 15 weeks    Recommended Referrals to Other Professionals:  none  Education Assessment:   Learner/Method: Patient;Listening;Demonstration;Pictures/Video;No Barriers to Learning    Risks and benefits of evaluation/treatment have been explained.   Patient/Family/caregiver agrees with Plan of Care.     Evaluation Time:     PT Eval, Low Complexity Minutes (68694): 13     Signing Clinician: EVE [facility]                                                                                   OUTPATIENT PHYSICAL THERAPY      PLAN OF TREATMENT FOR OUTPATIENT REHABILITATION   Patient's Last Name, First Name, Coy Arizan  [facility] YOB: 1990   Provider's Name   [facility]   Medical Record No.  [id number]     Onset Date: 10/10/23  Start of Care Date: 10/13/23     Medical Diagnosis:  Chronic bilateral low back pain without sciatica, Pain of left lower extremity      PT Treatment Diagnosis:  low back pain, left hamstring weakness Plan of Treatment  Frequency/Duration: 1x week for 3 visits progressing to 1x every 3 weeks/ 15 weeks    Certification date from 10/13/23 to 01/10/24         See note for plan of treatment details and functional goals     Avis Nassar, PT                         I CERTIFY THE NEED FOR THESE SERVICES FURNISHED UNDER        THIS PLAN OF TREATMENT AND WHILE UNDER MY CARE .             Physician Signature               Date    X_____________________________________________________                     Referring Provider:  Joel Baca      Initial Assessment  See Epic Evaluation- Start of Care Date: 10/13/23

## 2023-10-31 ENCOUNTER — THERAPY VISIT (OUTPATIENT)
Dept: PHYSICAL THERAPY | Facility: CLINIC | Age: 33
End: 2023-10-31
Attending: FAMILY MEDICINE
Payer: COMMERCIAL

## 2023-10-31 DIAGNOSIS — M54.50 CHRONIC BILATERAL LOW BACK PAIN WITHOUT SCIATICA: Primary | ICD-10-CM

## 2023-10-31 DIAGNOSIS — M79.605 PAIN OF LEFT LOWER EXTREMITY: ICD-10-CM

## 2023-10-31 DIAGNOSIS — G89.29 CHRONIC BILATERAL LOW BACK PAIN WITHOUT SCIATICA: Primary | ICD-10-CM

## 2023-10-31 PROCEDURE — 97110 THERAPEUTIC EXERCISES: CPT | Mod: GP

## 2023-11-07 ENCOUNTER — THERAPY VISIT (OUTPATIENT)
Dept: PHYSICAL THERAPY | Facility: CLINIC | Age: 33
End: 2023-11-07
Attending: FAMILY MEDICINE
Payer: COMMERCIAL

## 2023-11-07 DIAGNOSIS — G89.29 CHRONIC BILATERAL LOW BACK PAIN WITHOUT SCIATICA: Primary | ICD-10-CM

## 2023-11-07 DIAGNOSIS — M79.605 PAIN OF LEFT LOWER EXTREMITY: ICD-10-CM

## 2023-11-07 DIAGNOSIS — M54.50 CHRONIC BILATERAL LOW BACK PAIN WITHOUT SCIATICA: Primary | ICD-10-CM

## 2023-11-07 PROCEDURE — 97110 THERAPEUTIC EXERCISES: CPT | Mod: GP

## 2023-11-24 ENCOUNTER — THERAPY VISIT (OUTPATIENT)
Dept: PHYSICAL THERAPY | Facility: CLINIC | Age: 33
End: 2023-11-24
Payer: COMMERCIAL

## 2023-11-24 DIAGNOSIS — M54.50 CHRONIC BILATERAL LOW BACK PAIN WITHOUT SCIATICA: Primary | ICD-10-CM

## 2023-11-24 DIAGNOSIS — M79.605 PAIN OF LEFT LOWER EXTREMITY: ICD-10-CM

## 2023-11-24 DIAGNOSIS — G89.29 CHRONIC BILATERAL LOW BACK PAIN WITHOUT SCIATICA: Primary | ICD-10-CM

## 2023-11-24 PROCEDURE — 97110 THERAPEUTIC EXERCISES: CPT | Mod: 59

## 2023-11-24 PROCEDURE — 97530 THERAPEUTIC ACTIVITIES: CPT | Mod: GP

## 2023-12-29 ENCOUNTER — THERAPY VISIT (OUTPATIENT)
Dept: PHYSICAL THERAPY | Facility: CLINIC | Age: 33
End: 2023-12-29
Payer: COMMERCIAL

## 2023-12-29 DIAGNOSIS — M79.605 PAIN OF LEFT LOWER EXTREMITY: ICD-10-CM

## 2023-12-29 DIAGNOSIS — G89.29 CHRONIC BILATERAL LOW BACK PAIN WITHOUT SCIATICA: Primary | ICD-10-CM

## 2023-12-29 DIAGNOSIS — M54.50 CHRONIC BILATERAL LOW BACK PAIN WITHOUT SCIATICA: Primary | ICD-10-CM

## 2023-12-29 PROCEDURE — 97110 THERAPEUTIC EXERCISES: CPT | Mod: GP

## 2023-12-29 PROCEDURE — 97530 THERAPEUTIC ACTIVITIES: CPT | Mod: GP

## 2024-02-21 PROBLEM — M79.605 PAIN OF LEFT LOWER EXTREMITY: Status: RESOLVED | Noted: 2023-10-13 | Resolved: 2024-02-21

## 2024-02-21 NOTE — PROGRESS NOTES
DISCHARGE  Reason for Discharge: Patient has met all goals.  Patient chooses to discontinue therapy.    Discharge Plan: Patient to continue home program.    Referring Provider:  Joel Baca     12/29/23 0500   Appointment Info   Signing clinician's name / credentials Avis Nassar, PT, DPT   Total/Authorized Visits 6 per PT   Visits Used 5   Medical Diagnosis Chronic bilateral low back pain without sciatica, Pain of left lower extremity   PT Tx Diagnosis low back pain, left hamstring weakness   Quick Adds Certification   Progress Note/Certification   Start of Care Date 10/13/23   Onset of illness/injury or Date of Surgery 10/10/23   Therapy Frequency 1x week for 3 visits progressing to 1x every 3 weeks   Predicted Duration 15 weeks   Certification date from 10/13/23   Certification date to 01/10/24   Progress Note Due Date 01/26/24   Progress Note Completed Date 10/13/23   GOALS   PT Goals 2   PT Goal 1   Goal Identifier LTG   Goal Description Patient will be able to sit for longer than 1 hour with 2/10 pain or less in lower back   Rationale to maximize safety and independence within the home;to maximize safety and independence within the community;to maximize safety and independence with performance of ADLs and functional tasks;to maximize safety and independence with self cares   Goal Progress after 1 hour will get 3/10 pain   Target Date 01/26/24   PT Goal 2   Goal Identifier STG   Goal Description patient will be able to go 1 week without symptom referral to L LE   Rationale to maximize safety and independence with performance of ADLs and functional tasks;to maximize safety and independence within the home;to maximize safety and independence with self cares   Goal Progress decreased hamstring pain overall, mild pain along L LE with prolonged sitting/driving   Target Date 12/08/23   Date Met 12/29/23   Subjective Report   Subjective Report Has felt some plateau in progress in back recently. Feels  his hamstring is mostly better and does not bother him. With long car/plane rides and sitting has the most back pain.   Objective Measures   Objective Measures Objective Measure 1;Objective Measure 2   Objective Measure 1   Objective Measure squat   Details 20 lbs dumbbells, held extension throughout, cueing for neutral spine   Objective Measure 2   Objective Measure front squat 20lbs dumbbells, good form, no reproduction of symptoms, cueing for neutral spine   Treatment Interventions (PT)   Interventions Therapeutic Procedure/Exercise;Therapeutic Activity   Therapeutic Procedure/Exercise   Therapeutic Procedures: strength, endurance, ROM, flexibillity minutes (23274) 8   Therapeutic Procedures Ther Proc 2   Ther Proc 1 reviewed HEP, modified extension exercises, lifting form   Ther Proc 2 Squatting   Ther Proc 2 - Details front and back squat form assessment. Cueing for neutral spine in squatting, pt reporting more difficult to squat, no back pain   PTRx Ther Proc 1 Standing Extension   PTRx Ther Proc 1 - Details education to increase frequency to multiple times per day vs 3x a week.   PTRx Ther Proc 2 Repeated Extension in Lying with Lock/Sag and L Lateral shift   PTRx Ther Proc 2 - Details discussed relaxing lower back and letting hips sag into movement to increase lordosis. added in lateral shift L   Skilled Intervention skilled cueing and form modification   Patient Response/Progress no adverse response.   Therapeutic Activity   Therapeutic Activities: dynamic activities to improve functional performance minutes (36264) 23   Ther Act 1 education   Ther Act 1 - Details extensive education on spinal mechanics and reducing discs through extension. Safe return to weight lifting activities, getting back to flexion while monitoring radicular symptoms, what to look for as signs for improvement vs regression in terms of back and radiular pain. Posture and sitting position throughout the day.   Skilled Intervention  instruction provided   Patient Response/Progress demo's understanding, less pain with lumbar flexion   Ther Act 2 sleep   Ther Act 2 - Details sleeping positioning, pillows between knees to provide more netural spine, biasing extenion if able.   Therapeutic Activities Ther Act 2   Education   Learner/Method Patient;Listening;Demonstration;Pictures/Video;No Barriers to Learning   Plan   Home program per ptrx   Plan for next session 1-2 month follow ups, recheck symptoms, R side gliding to bias L side bending.   Total Session Time   Timed Code Treatment Minutes 31   Total Treatment Time (sum of timed and untimed services) 31

## 2024-12-01 ENCOUNTER — HEALTH MAINTENANCE LETTER (OUTPATIENT)
Age: 34
End: 2024-12-01